# Patient Record
Sex: FEMALE | Race: WHITE | Employment: FULL TIME | URBAN - METROPOLITAN AREA
[De-identification: names, ages, dates, MRNs, and addresses within clinical notes are randomized per-mention and may not be internally consistent; named-entity substitution may affect disease eponyms.]

---

## 2018-10-03 ENCOUNTER — ED HISTORICAL/CONVERTED ENCOUNTER (OUTPATIENT)
Dept: OTHER | Age: 63
End: 2018-10-03

## 2021-03-23 ENCOUNTER — APPOINTMENT (OUTPATIENT)
Dept: CT IMAGING | Age: 66
End: 2021-03-23
Attending: NURSE PRACTITIONER
Payer: MEDICARE

## 2021-03-23 ENCOUNTER — HOSPITAL ENCOUNTER (OUTPATIENT)
Age: 66
Setting detail: OBSERVATION
Discharge: HOME | End: 2021-03-24
Attending: FAMILY MEDICINE | Admitting: FAMILY MEDICINE
Payer: MEDICARE

## 2021-03-23 DIAGNOSIS — R90.89 ABNORMAL CT OF BRAIN: ICD-10-CM

## 2021-03-23 DIAGNOSIS — G44.89 OTHER HEADACHE SYNDROME: Primary | ICD-10-CM

## 2021-03-23 DIAGNOSIS — I10 ESSENTIAL HYPERTENSION: ICD-10-CM

## 2021-03-23 PROCEDURE — 70450 CT HEAD/BRAIN W/O DYE: CPT

## 2021-03-23 PROCEDURE — 96374 THER/PROPH/DIAG INJ IV PUSH: CPT

## 2021-03-23 PROCEDURE — 96375 TX/PRO/DX INJ NEW DRUG ADDON: CPT

## 2021-03-23 PROCEDURE — 99285 EMERGENCY DEPT VISIT HI MDM: CPT

## 2021-03-24 ENCOUNTER — APPOINTMENT (OUTPATIENT)
Dept: MRI IMAGING | Age: 66
End: 2021-03-24
Attending: FAMILY MEDICINE
Payer: MEDICARE

## 2021-03-24 VITALS
RESPIRATION RATE: 16 BRPM | BODY MASS INDEX: 21.76 KG/M2 | HEART RATE: 68 BPM | DIASTOLIC BLOOD PRESSURE: 85 MMHG | SYSTOLIC BLOOD PRESSURE: 134 MMHG | OXYGEN SATURATION: 98 % | TEMPERATURE: 98.1 F | WEIGHT: 152 LBS | HEIGHT: 70 IN

## 2021-03-24 PROBLEM — R90.89 ABNORMAL CT OF BRAIN: Status: ACTIVE | Noted: 2021-03-24

## 2021-03-24 LAB
ANION GAP SERPL CALC-SCNC: 5 MMOL/L (ref 5–15)
APTT PPP: 25.9 SEC (ref 23–35.7)
ATRIAL RATE: 66 BPM
BASOPHILS # BLD: 0 K/UL (ref 0–0.1)
BASOPHILS NFR BLD: 0 % (ref 0–1)
BUN SERPL-MCNC: 17 MG/DL (ref 6–20)
BUN/CREAT SERPL: 20 (ref 12–20)
CA-I BLD-MCNC: 9.3 MG/DL (ref 8.5–10.1)
CALCULATED P AXIS, ECG09: 57 DEGREES
CALCULATED R AXIS, ECG10: 109 DEGREES
CALCULATED T AXIS, ECG11: 14 DEGREES
CHLORIDE SERPL-SCNC: 107 MMOL/L (ref 97–108)
CO2 SERPL-SCNC: 28 MMOL/L (ref 21–32)
CREAT SERPL-MCNC: 0.84 MG/DL (ref 0.55–1.02)
CRP SERPL-MCNC: <0.29 MG/DL (ref 0–0.6)
DIAGNOSIS, 93000: NORMAL
DIFFERENTIAL METHOD BLD: NORMAL
EOSINOPHIL # BLD: 0.1 K/UL (ref 0–0.4)
EOSINOPHIL NFR BLD: 2 % (ref 0–7)
ERYTHROCYTE [DISTWIDTH] IN BLOOD BY AUTOMATED COUNT: 12.7 % (ref 11.5–14.5)
ERYTHROCYTE [SEDIMENTATION RATE] IN BLOOD: 9 MM/HR
GLUCOSE SERPL-MCNC: 105 MG/DL (ref 65–100)
HCT VFR BLD AUTO: 41.3 % (ref 35–47)
HGB BLD-MCNC: 13.7 G/DL (ref 11.5–16)
IMM GRANULOCYTES # BLD AUTO: 0 K/UL (ref 0–0.04)
IMM GRANULOCYTES NFR BLD AUTO: 0 % (ref 0–0.5)
INR PPP: 1 (ref 0.9–1.1)
LYMPHOCYTES # BLD: 2.2 K/UL (ref 0.8–3.5)
LYMPHOCYTES NFR BLD: 34 % (ref 12–49)
MCH RBC QN AUTO: 32.7 PG (ref 26–34)
MCHC RBC AUTO-ENTMCNC: 33.2 G/DL (ref 30–36.5)
MCV RBC AUTO: 98.6 FL (ref 80–99)
MONOCYTES # BLD: 0.7 K/UL (ref 0–1)
MONOCYTES NFR BLD: 11 % (ref 5–13)
NEUTS SEG # BLD: 3.4 K/UL (ref 1.8–8)
NEUTS SEG NFR BLD: 53 % (ref 32–75)
P-R INTERVAL, ECG05: 172 MS
PLATELET # BLD AUTO: 189 K/UL (ref 150–400)
PMV BLD AUTO: 11 FL (ref 8.9–12.9)
POTASSIUM SERPL-SCNC: 3.7 MMOL/L (ref 3.5–5.1)
PROTHROMBIN TIME: 13 SEC (ref 11.9–14.7)
Q-T INTERVAL, ECG07: 404 MS
QRS DURATION, ECG06: 88 MS
QTC CALCULATION (BEZET), ECG08: 423 MS
RBC # BLD AUTO: 4.19 M/UL (ref 3.8–5.2)
SODIUM SERPL-SCNC: 140 MMOL/L (ref 136–145)
THERAPEUTIC RANGE,PTTT: NORMAL SEC (ref 68–109)
VENTRICULAR RATE, ECG03: 66 BPM
WBC # BLD AUTO: 6.5 K/UL (ref 3.6–11)

## 2021-03-24 PROCEDURE — 74011250636 HC RX REV CODE- 250/636: Performed by: HOSPITALIST

## 2021-03-24 PROCEDURE — 70553 MRI BRAIN STEM W/O & W/DYE: CPT

## 2021-03-24 PROCEDURE — 85610 PROTHROMBIN TIME: CPT

## 2021-03-24 PROCEDURE — 36415 COLL VENOUS BLD VENIPUNCTURE: CPT

## 2021-03-24 PROCEDURE — 74011250636 HC RX REV CODE- 250/636: Performed by: NURSE PRACTITIONER

## 2021-03-24 PROCEDURE — 85652 RBC SED RATE AUTOMATED: CPT

## 2021-03-24 PROCEDURE — 96374 THER/PROPH/DIAG INJ IV PUSH: CPT

## 2021-03-24 PROCEDURE — 85730 THROMBOPLASTIN TIME PARTIAL: CPT

## 2021-03-24 PROCEDURE — A9577 INJ MULTIHANCE: HCPCS | Performed by: HOSPITALIST

## 2021-03-24 PROCEDURE — 85025 COMPLETE CBC W/AUTO DIFF WBC: CPT

## 2021-03-24 PROCEDURE — 74011250637 HC RX REV CODE- 250/637: Performed by: NURSE PRACTITIONER

## 2021-03-24 PROCEDURE — 80048 BASIC METABOLIC PNL TOTAL CA: CPT

## 2021-03-24 PROCEDURE — 93005 ELECTROCARDIOGRAM TRACING: CPT

## 2021-03-24 PROCEDURE — 86140 C-REACTIVE PROTEIN: CPT

## 2021-03-24 PROCEDURE — 99218 HC RM OBSERVATION: CPT

## 2021-03-24 RX ORDER — SODIUM CHLORIDE 0.9 % (FLUSH) 0.9 %
5-40 SYRINGE (ML) INJECTION EVERY 8 HOURS
Status: DISCONTINUED | OUTPATIENT
Start: 2021-03-24 | End: 2021-03-24 | Stop reason: HOSPADM

## 2021-03-24 RX ORDER — HYDRALAZINE HYDROCHLORIDE 20 MG/ML
5 INJECTION INTRAMUSCULAR; INTRAVENOUS ONCE
Status: COMPLETED | OUTPATIENT
Start: 2021-03-24 | End: 2021-03-24

## 2021-03-24 RX ORDER — EZETIMIBE 10 MG/1
10 TABLET ORAL DAILY
COMMUNITY
Start: 2021-02-19

## 2021-03-24 RX ORDER — ONDANSETRON 2 MG/ML
4 INJECTION INTRAMUSCULAR; INTRAVENOUS
Status: DISCONTINUED | OUTPATIENT
Start: 2021-03-24 | End: 2021-03-24 | Stop reason: HOSPADM

## 2021-03-24 RX ORDER — LISINOPRIL 10 MG/1
10 TABLET ORAL DAILY
Qty: 30 TAB | Refills: 0 | OUTPATIENT
Start: 2021-03-24 | End: 2021-03-24 | Stop reason: SDUPTHER

## 2021-03-24 RX ORDER — ASPIRIN 325 MG
325 TABLET ORAL
Status: COMPLETED | OUTPATIENT
Start: 2021-03-24 | End: 2021-03-24

## 2021-03-24 RX ORDER — ACETAMINOPHEN 325 MG/1
650 TABLET ORAL
Status: DISCONTINUED | OUTPATIENT
Start: 2021-03-24 | End: 2021-03-24 | Stop reason: HOSPADM

## 2021-03-24 RX ORDER — PROMETHAZINE HYDROCHLORIDE 25 MG/1
12.5 TABLET ORAL
Status: DISCONTINUED | OUTPATIENT
Start: 2021-03-24 | End: 2021-03-24 | Stop reason: HOSPADM

## 2021-03-24 RX ORDER — ACETAMINOPHEN 650 MG/1
650 SUPPOSITORY RECTAL
Status: DISCONTINUED | OUTPATIENT
Start: 2021-03-24 | End: 2021-03-24 | Stop reason: HOSPADM

## 2021-03-24 RX ORDER — EZETIMIBE 10 MG/1
10 TABLET ORAL DAILY
Status: CANCELLED | OUTPATIENT
Start: 2021-03-24

## 2021-03-24 RX ORDER — SODIUM CHLORIDE 0.9 % (FLUSH) 0.9 %
5-40 SYRINGE (ML) INJECTION AS NEEDED
Status: DISCONTINUED | OUTPATIENT
Start: 2021-03-24 | End: 2021-03-24 | Stop reason: HOSPADM

## 2021-03-24 RX ORDER — HYDRALAZINE HYDROCHLORIDE 25 MG/1
25 TABLET, FILM COATED ORAL
Status: DISCONTINUED | OUTPATIENT
Start: 2021-03-24 | End: 2021-03-24 | Stop reason: HOSPADM

## 2021-03-24 RX ORDER — POLYETHYLENE GLYCOL 3350 17 G/17G
17 POWDER, FOR SOLUTION ORAL DAILY PRN
Status: DISCONTINUED | OUTPATIENT
Start: 2021-03-24 | End: 2021-03-24 | Stop reason: HOSPADM

## 2021-03-24 RX ORDER — ERGOCALCIFEROL 1.25 MG/1
50000 CAPSULE ORAL
COMMUNITY
Start: 2021-02-19

## 2021-03-24 RX ORDER — LISINOPRIL 10 MG/1
10 TABLET ORAL DAILY
Qty: 30 TAB | Refills: 0 | Status: SHIPPED | OUTPATIENT
Start: 2021-03-24

## 2021-03-24 RX ADMIN — ASPIRIN 325 MG ORAL TABLET 325 MG: 325 PILL ORAL at 01:19

## 2021-03-24 RX ADMIN — HYDRALAZINE HYDROCHLORIDE 5 MG: 20 INJECTION INTRAMUSCULAR; INTRAVENOUS at 01:20

## 2021-03-24 RX ADMIN — GADOBENATE DIMEGLUMINE 15 ML: 529 INJECTION, SOLUTION INTRAVENOUS at 08:16

## 2021-03-24 NOTE — PROGRESS NOTES
// Pt informed of MOON notification, verbalized understanding & signed. Copy given to pt, placed in chart, & original to HIM for scanning into EMR.

## 2021-03-24 NOTE — ED PROVIDER NOTES
EMERGENCY DEPARTMENT HISTORY AND PHYSICAL EXAM      Date: 3/23/2021  Patient Name: Florentino Pike    History of Presenting Illness     Chief Complaint   Patient presents with    Headache       History Provided By: Patient    HPI: Florentino Pike, 72 y.o. female with a past medical history significant hypertension and hyperlipidemia presents to the ED with cc of headache. Patient states that beginning Sunday she woke up with a headache. Patient does not have a history of headaches. Patient states she took her blood pressure at that time and it was in the 160s. Patient has been continually taken blood pressure since then with a high readings in the 180s. Patient was unable to get an appointment with her PCP so [facility]  Patient first sent to her to the ER [facility] for CT of her head. Patient denies any slurred speech. Patient denies any dizziness, weakness, or any other neurological symptoms. Patient describes the headache as dull in nature on both sides of her head. Patient also describes her headache as waxing and waning in nature. There are no other complaints, changes, or physical findings at this time. PCP: Bertin Escobedo MD    No current facility-administered medications on file prior to encounter. No current outpatient medications on file prior to encounter. Past History     Past Medical History:  Past Medical History:   Diagnosis Date    CAD (coronary artery disease)     high cholesterol diet controlled    Hypercholesteremia     Hypertension     Other ill-defined conditions(799.89)     back problems       Past Surgical History:  Past Surgical History:   Procedure Laterality Date    HX HIP REPLACEMENT      HX OTHER SURGICAL      colonoscopy/polypectomy 3yrs.  ago    SC ABDOMEN SURGERY PROC UNLISTED      cholecystectomy       Family History:  Family History   Problem Relation Age of Onset    Heart Disease Mother         CHF    Cancer Father lung       Social History:  Social History     Tobacco Use    Smoking status: Never Smoker    Smokeless tobacco: Never Used   Substance Use Topics    Alcohol use: Yes     Alcohol/week: 1.0 standard drinks     Types: 1 Glasses of wine per week     Comment: a glass of wine a week or so     Drug use: Never       Allergies:  No Known Allergies      Review of Systems     Review of Systems   Constitutional: Negative for chills, fatigue and fever. HENT: Negative for congestion, sinus pressure and trouble swallowing. Eyes: Negative for photophobia and pain. Respiratory: Negative for cough and shortness of breath. Cardiovascular: Negative for chest pain and leg swelling. Gastrointestinal: Negative for abdominal pain, diarrhea, nausea and vomiting. Endocrine: Negative for polydipsia, polyphagia and polyuria. Genitourinary: Negative for decreased urine volume, difficulty urinating, dysuria, hematuria and urgency. Musculoskeletal: Negative for back pain, gait problem, myalgias and neck pain. Skin: Negative for pallor and rash. Allergic/Immunologic: Negative for environmental allergies and food allergies. Neurological: Positive for headaches. Negative for dizziness, facial asymmetry, speech difficulty and numbness. Hematological: Negative for adenopathy. Does not bruise/bleed easily. Psychiatric/Behavioral: Negative for agitation, self-injury and suicidal ideas. The patient is not nervous/anxious. Physical Exam     Physical Exam  Vitals signs and nursing note reviewed. Constitutional:       Appearance: Normal appearance. HENT:      Head: Atraumatic. Right Ear: Tympanic membrane and external ear normal.      Left Ear: Tympanic membrane and external ear normal.      Nose: Nose normal.      Mouth/Throat:      Mouth: Mucous membranes are moist.   Eyes:      Extraocular Movements: Extraocular movements intact. Pupils: Pupils are equal, round, and reactive to light.    Neck: Musculoskeletal: Normal range of motion and neck supple. Cardiovascular:      Rate and Rhythm: Normal rate and regular rhythm. Pulses: Normal pulses. Heart sounds: Normal heart sounds. Pulmonary:      Breath sounds: Normal breath sounds. Abdominal:      General: Abdomen is flat. Palpations: Abdomen is soft. Musculoskeletal: Normal range of motion. Skin:     General: Skin is warm and dry. Capillary Refill: Capillary refill takes less than 2 seconds. Neurological:      General: No focal deficit present. Mental Status: She is alert and oriented to person, place, and time. Mental status is at baseline. Psychiatric:         Mood and Affect: Mood normal.         Behavior: Behavior normal.         Lab and Diagnostic Study Results     Labs -     Recent Results (from the past 12 hour(s))   CBC WITH AUTOMATED DIFF    Collection Time: 03/24/21 12:14 AM   Result Value Ref Range    WBC 6.5 3.6 - 11.0 K/uL    RBC 4.19 3.80 - 5.20 M/uL    HGB 13.7 11.5 - 16.0 g/dL    HCT 41.3 35.0 - 47.0 %    MCV 98.6 80.0 - 99.0 FL    MCH 32.7 26.0 - 34.0 PG    MCHC 33.2 30.0 - 36.5 g/dL    RDW 12.7 11.5 - 14.5 %    PLATELET 295 814 - 597 K/uL    MPV 11.0 8.9 - 12.9 FL    NEUTROPHILS 53 32 - 75 %    LYMPHOCYTES 34 12 - 49 %    MONOCYTES 11 5 - 13 %    EOSINOPHILS 2 0 - 7 %    BASOPHILS 0 0 - 1 %    IMMATURE GRANULOCYTES 0 0.0 - 0.5 %    ABS. NEUTROPHILS 3.4 1.8 - 8.0 K/UL    ABS. LYMPHOCYTES 2.2 0.8 - 3.5 K/UL    ABS. MONOCYTES 0.7 0.0 - 1.0 K/UL    ABS. EOSINOPHILS 0.1 0.0 - 0.4 K/UL    ABS. BASOPHILS 0.0 0.0 - 0.1 K/UL    ABS. IMM.  GRANS. 0.0 0.00 - 0.04 K/UL    DF AUTOMATED     METABOLIC PANEL, BASIC    Collection Time: 03/24/21 12:14 AM   Result Value Ref Range    Sodium 140 136 - 145 mmol/L    Potassium 3.7 3.5 - 5.1 mmol/L    Chloride 107 97 - 108 mmol/L    CO2 28 21 - 32 mmol/L    Anion gap 5 5 - 15 mmol/L    Glucose 105 (H) 65 - 100 mg/dL    BUN 17 6 - 20 mg/dL    Creatinine 0.84 0.55 - 1.02 mg/dL BUN/Creatinine ratio 20 12 - 20      GFR est AA >60 >60 ml/min/1.73m2    GFR est non-AA >60 >60 ml/min/1.73m2    Calcium 9.3 8.5 - 10.1 mg/dL   PROTHROMBIN TIME + INR    Collection Time: 03/24/21 12:14 AM   Result Value Ref Range    Prothrombin time 13.0 11.9 - 14.7 sec    INR 1.0 0.9 - 1.1     PTT    Collection Time: 03/24/21 12:14 AM   Result Value Ref Range    aPTT 25.9 23.0 - 35.7 sec    aPTT, therapeutic range   68 - 109 sec   SED RATE (ESR)    Collection Time: 03/24/21 12:14 AM   Result Value Ref Range    Sed rate, automated 9 mm/hr   C REACTIVE PROTEIN, QT    Collection Time: 03/24/21 12:14 AM   Result Value Ref Range    C-Reactive protein <0.29 0.00 - 0.60 mg/dL       Radiologic Studies -   @lastxrresult@  CT Results  (Last 48 hours)    None        CXR Results  (Last 48 hours)    None                ED from 3/23/2021 in [facility] EMERGENCY DEPT    3/24/21    0027   NIH Stroke Scale     Interval Baseline   LOC Alert   LOC Questions Answers both questions correctly   LOC Commands Performs both tasks correctly   Best Gaze Normal   Visual No visual loss   Facial Palsy Normal symmetrical movement   Motor Right Arm No drift   Motor Left Arm No drift   Motor Right Leg No drift   Motor Left Leg No drift   Limb Ataxia Absent   Sensory Normal   Best Language No aphasia   Dysarthria Normal   Extinction and Inattention No abnormality   Total 0         Medical Decision Making   - I am the first provider for this patient. - I reviewed the vital signs, available nursing notes, past medical history, past surgical history, family history and social history. - Initial assessment performed. The patients presenting problems have been discussed, and they are in agreement with the care plan formulated and outlined with them. I have encouraged them to ask questions as they arise throughout their visit. Vital Signs-Reviewed the patient's vital signs.   Patient Vitals for the past 12 hrs:   Temp Pulse Resp BP SpO2   03/24/21 0119  67  (!) 149/92    03/23/21 2303 98.1 °F (36.7 °C) 67 17 (!) 185/74 100 %       Records Reviewed: Nursing Notes and Old Medical Records          ED Course:          Provider Notes (Medical Decision Making):   Pt presents with acute headache; afebrile with stable vitals; exam is without focal deficits. DDx: migraine, tension headache, cluster HA, stress, hypertensive urgency, dehydration. HA was gradual onset, pt neuro intact, no nausea/vomiting/focal weakness/sensory change to suggest CVA or ICH. Also pt is not immunocompromised, no fever, no focal weakness to suggest brain abscess. Therefore, no CT head. No neck stiffness, fever, AMS, photophobia to suggest meningitis. Neg kernig and Brudzinski. Therefore no LP. No jaw claudication, visual changes or temporal pain to suggest temporal arteritis, therefore no ESR/CRP. No eye pain, PERRL, no red eye to suggest glaucoma. No risk factors for CO. Will treat pain and reassess. MDM       Procedures   Medical Decision Makingedical Decision Making  Performed by: Paul Davis NP  PROCEDURES:  Procedures       Disposition   Disposition: Admitted to Floor Medical Floor the case was discussed with the admitting physician Dr Siobhan Moyer:  1. There are no discharge medications for this patient. 2.   Follow-up Information    None       3. Return to ED if worse   4. There are no discharge medications for this patient. Diagnosis     Clinical Impression:   1. Other headache syndrome    2. Abnormal CT of brain    3. Essential hypertension        Attestations:    Paul Davis NP    Please note that this dictation was completed with [employer], the [employer] voice recognition software. Quite often unanticipated grammatical, syntax, homophones, and other interpretive errors are inadvertently transcribed by the computer software. Please disregard these errors. Please excuse any errors that have escaped final proofreading. Thank you.

## 2021-03-24 NOTE — ED PROVIDER NOTES
EMERGENCY DEPARTMENT HISTORY AND PHYSICAL EXAM 
 
 
Date: 3/23/2021 Patient Name: Shaylee Mclean History of Presenting Illness Chief Complaint Patient presents with  
 Headache History Provided By: Patient HPI: Shaylee Mclean, 72 y.o. female with a past medical history significant hypertension and hyperlipidemia presents to the ED with cc of headache. Patient states that beginning Sunday she woke up with a headache. Patient does not have a history of headaches. Patient states she took her blood pressure at that time and it was in the 160s. Patient has been continually taken blood pressure since then with a high readings in the 180s. Patient was unable to get an appointment with her PCP [facility]  Patient first sent to her to the ER [facility] for CT of her head. Patient denies any slurred speech. Patient denies any dizziness, weakness, or any other neurological symptoms. Patient describes the headache as dull in nature on both sides of her head. Patient also describes her headache as waxing and waning in nature. There are no other complaints, changes, or physical findings at this time. PCP: Elbert Mariee MD 
 
No current facility-administered medications on file prior to encounter. No current outpatient medications on file prior to encounter. Past History Past Medical History: 
Past Medical History:  
Diagnosis Date  CAD (coronary artery disease)   
 high cholesterol diet controlled  Hypercholesteremia  Hypertension  Other ill-defined conditions(709.89)   
 back problems Past Surgical History: 
Past Surgical History:  
Procedure Laterality Date  HX HIP REPLACEMENT    
 HX OTHER SURGICAL    
 colonoscopy/polypectomy 3yrs. ago  MA ABDOMEN SURGERY PROC UNLISTED    
 cholecystectomy Family History: 
Family History Problem Relation Age of Onset  Heart Disease Mother CHF  Cancer Father lung  
 
 
Social History: 
Social History Tobacco Use  Smoking status: Never Smoker  Smokeless tobacco: Never Used Substance Use Topics  Alcohol use: Yes Alcohol/week: 1.0 standard drinks Types: 1 Glasses of wine per week Comment: a glass of wine a week or so  Drug use: Never Allergies: 
No Known Allergies Review of Systems Review of Systems Constitutional: Negative for chills, fatigue and fever. HENT: Negative for congestion, sinus pressure and trouble swallowing. Eyes: Negative for photophobia and pain. Respiratory: Negative for cough and shortness of breath. Cardiovascular: Negative for chest pain and leg swelling. Gastrointestinal: Negative for abdominal pain, diarrhea, nausea and vomiting. Endocrine: Negative for polydipsia, polyphagia and polyuria. Genitourinary: Negative for decreased urine volume, difficulty urinating, dysuria, hematuria and urgency. Musculoskeletal: Negative for back pain, gait problem, myalgias and neck pain. Skin: Negative for pallor and rash. Allergic/Immunologic: Negative for environmental allergies and food allergies. Neurological: Positive for headaches. Negative for dizziness, facial asymmetry, speech difficulty and numbness. Hematological: Negative for adenopathy. Does not bruise/bleed easily. Psychiatric/Behavioral: Negative for agitation, self-injury and suicidal ideas. The patient is not nervous/anxious. Physical Exam  
 
Physical Exam 
Vitals signs and nursing note reviewed. Constitutional:   
   Appearance: Normal appearance. HENT:  
   Head: Atraumatic. Right Ear: Tympanic membrane and external ear normal.  
   Left Ear: Tympanic membrane and external ear normal.  
   Nose: Nose normal.  
   Mouth/Throat:  
   Mouth: Mucous membranes are moist.  
Eyes:  
   Extraocular Movements: Extraocular movements intact. Pupils: Pupils are equal, round, and reactive to light. Neck: Musculoskeletal: Normal range of motion and neck supple. Cardiovascular:  
   Rate and Rhythm: Normal rate and regular rhythm. Pulses: Normal pulses. Heart sounds: Normal heart sounds. Pulmonary:  
   Breath sounds: Normal breath sounds. Abdominal:  
   General: Abdomen is flat. Palpations: Abdomen is soft. Musculoskeletal: Normal range of motion. Skin: 
   General: Skin is warm and dry. Capillary Refill: Capillary refill takes less than 2 seconds. Neurological:  
   General: No focal deficit present. Mental Status: She is alert and oriented to person, place, and time. Mental status is at baseline. Psychiatric:     
   Mood and Affect: Mood normal.     
   Behavior: Behavior normal.  
 
 
 
Lab and Diagnostic Study Results Labs - Recent Results (from the past 12 hour(s)) METABOLIC PANEL, BASIC Collection Time: 03/24/21 12:14 AM  
Result Value Ref Range Sodium 140 136 - 145 mmol/L Potassium 3.7 3.5 - 5.1 mmol/L Chloride 107 97 - 108 mmol/L  
 CO2 28 21 - 32 mmol/L Anion gap 5 5 - 15 mmol/L Glucose 105 (H) 65 - 100 mg/dL BUN 17 6 - 20 mg/dL Creatinine 0.84 0.55 - 1.02 mg/dL BUN/Creatinine ratio 20 12 - 20 GFR est AA >60 >60 ml/min/1.73m2 GFR est non-AA >60 >60 ml/min/1.73m2 Calcium 9.3 8.5 - 10.1 mg/dL C REACTIVE PROTEIN, QT Collection Time: 03/24/21 12:14 AM  
Result Value Ref Range C-Reactive protein <0.29 0.00 - 0.60 mg/dL Radiologic Studies -  
@lastxrresult@ CT Results  (Last 48 hours) None CXR Results  (Last 48 hours) None ED from 3/23/2021 in [facility] EMERGENCY DEPT  
 3/24/21  
 0788 NIH Stroke Scale Interval Baseline LOC Alert LOC Questions Answers both questions correctly LOC Commands Performs both tasks correctly Best Gaze Normal  
Visual No visual loss Facial Palsy Normal symmetrical movement Motor Right Arm No drift Motor Left Arm No drift Motor Right Leg No drift Motor Left Leg No drift Limb Ataxia Absent Sensory Normal  
Best Language No aphasia Dysarthria Normal  
Extinction and Inattention No abnormality Total 0 Medical Decision Making - I am the first provider for this patient. - I reviewed the vital signs, available nursing notes, past medical history, past surgical history, family history and social history. - Initial assessment performed. The patients presenting problems have been discussed, and they are in agreement with the care plan formulated and outlined with them. I have encouraged them to ask questions as they arise throughout their visit. Vital Signs-Reviewed the patient's vital signs. Patient Vitals for the past 12 hrs: 
 Temp Pulse Resp BP SpO2  
03/24/21 0119  67  (!) 149/92   
03/23/21 2303 98.1 °F (36.7 °C) 67 17 (!) 185/74 100 % Records Reviewed: Nursing Notes and Old Medical Records ED Course:  
 
  
 
Provider Notes (Medical Decision Making): Pt presents with acute headache; afebrile with stable vitals; exam is without focal deficits. DDx: migraine, tension headache, cluster HA, stress, hypertensive urgency, dehydration. HA was gradual onset, pt neuro intact, no nausea/vomiting/focal weakness/sensory change to suggest CVA or ICH. Also pt is not immunocompromised, no fever, no focal weakness to suggest brain abscess. Therefore, no CT head. No neck stiffness, fever, AMS, photophobia to suggest meningitis. Neg kernig and Brudzinski. Therefore no LP. No jaw claudication, visual changes or temporal pain to suggest temporal arteritis, therefore no ESR/CRP. No eye pain, PERRL, no red eye to suggest glaucoma. No risk factors for CO. Will treat pain and reassess. MDM Procedures Medical Decision Makingedical Decision Making Performed by: Lupe Jones NP 
PROCEDURES: 
Procedures Disposition Disposition: Admitted to Floor Medical Floor the case was discussed with the admitting physician Dr Carter Briggs DISCHARGE PLAN: 
1. There are no discharge medications for this patient. 2.  
Follow-up Information None 3. Return to ED if worse 4. There are no discharge medications for this patient. Diagnosis Clinical Impression: No diagnosis found. Attestations: 
 
Nii Arnold NP Please note that this dictation was completed with [employer], the computer voice recognition software. Quite often unanticipated grammatical, syntax, homophones, and other interpretive errors are inadvertently transcribed by the computer software. Please disregard these errors. Please excuse any errors that have escaped final proofreading. Thank you.

## 2021-03-24 NOTE — ED TRIAGE NOTES
[facility] went for a headache that she has had since Sunday, they sent her over here for a head CT patient has had some hypertension recently that she states she does not take medication for

## 2021-03-24 NOTE — CONSULTS
Consult Date: 3/24/2021    Consults MsJigna is a 72year old woman with history of diet controlled HTN who came in with BP in 185/75 and right parietal dull headaches since Sunday. Ct head showed some questionable hypodensity. MRI was reviewed by me and shows non specific mild white matter disease otherwise normal. Findings discussed with patient. Her BP has improved and headache also come down to 3/10 intensity. She is sitting up and talking. Subjective     Past Medical History:   Diagnosis Date    Hypercholesteremia     Vitamin D deficiency       Past Surgical History:   Procedure Laterality Date    HX HIP REPLACEMENT      HX OTHER SURGICAL      colonoscopy/polypectomy 3yrs. ago    [place] ABDOMEN SURGERY PROC UNLISTED      cholecystectomy     Family History   Problem Relation Age of Onset    Heart Disease Mother         CHF    Cancer Father         lung      Social History     Tobacco Use    Smoking status: Never Smoker    Smokeless tobacco: Never Used   Substance Use Topics    Alcohol use:  Yes     Alcohol/week: 1.0 standard drinks     Types: 1 Glasses of wine per week     Comment: a glass of wine a week or so        Current Facility-Administered Medications   Medication Dose Route Frequency Provider Last Rate Last Admin    sodium chloride (NS) flush 5-40 mL  5-40 mL IntraVENous Q8H Lesli Ha MD        sodium chloride (NS) flush 5-40 mL  5-40 mL IntraVENous PRN Lesli Ha MD        acetaminophen (TYLENOL) tablet 650 mg  650 mg Oral Q6H PRALEENA Ha MD        Or    acetaminophen (TYLENOL) suppository 650 mg  650 mg Rectal Q6H PRALEENA Ha MD        polyethylene glycol (MIRALAX) packet 17 g  17 g Oral DAILY PRN Lesli Ha MD        promethazine (PHENERGAN) tablet 12.5 mg  12.5 mg Oral Q6H PRALEENA Ha MD        Or    ondansetron [facility]) injection 4 mg  4 mg IntraVENous Q6H PRALEENA Ha MD        hydrALAZINE (APRESOLINE) tablet 25 mg  25 mg Oral Q8H PRN Dariela Carbajal MD         Current Outpatient Medications   Medication Sig Dispense Refill    ezetimibe (ZETIA) 10 mg tablet Take 10 mg by mouth daily.  ergocalciferol (ERGOCALCIFEROL) 1,250 mcg (50,000 unit) capsule Take 50,000 Units by mouth. Takes on Wednesdays and Sundays          Review of Systems   Neurological: Positive for headaches. All other systems reviewed and are negative. Objective     Vital signs for last 24 hours:  Visit Vitals  /85   Pulse 68   Temp 98.1 °F (36.7 °C)   Resp 16   Ht 5' 10\" (1.778 m)   Wt 68.9 kg (152 lb)   SpO2 98%   BMI 21.81 kg/m²       Intake/Output this shift:  Current Shift: No intake/output data recorded. Last 3 Shifts: No intake/output data recorded. Data Review:   Recent Results (from the past 24 hour(s))   CBC WITH AUTOMATED DIFF    Collection Time: 03/24/21 12:14 AM   Result Value Ref Range    WBC 6.5 3.6 - 11.0 K/uL    RBC 4.19 3.80 - 5.20 M/uL    HGB 13.7 11.5 - 16.0 g/dL    HCT 41.3 35.0 - 47.0 %    MCV 98.6 80.0 - 99.0 FL    MCH 32.7 26.0 - 34.0 PG    MCHC 33.2 30.0 - 36.5 g/dL    RDW 12.7 11.5 - 14.5 %    PLATELET 631 508 - 049 K/uL    MPV 11.0 8.9 - 12.9 FL    NEUTROPHILS 53 32 - 75 %    LYMPHOCYTES 34 12 - 49 %    MONOCYTES 11 5 - 13 %    EOSINOPHILS 2 0 - 7 %    BASOPHILS 0 0 - 1 %    IMMATURE GRANULOCYTES 0 0.0 - 0.5 %    ABS. NEUTROPHILS 3.4 1.8 - 8.0 K/UL    ABS. LYMPHOCYTES 2.2 0.8 - 3.5 K/UL    ABS. MONOCYTES 0.7 0.0 - 1.0 K/UL    ABS. EOSINOPHILS 0.1 0.0 - 0.4 K/UL    ABS. BASOPHILS 0.0 0.0 - 0.1 K/UL    ABS. IMM.  GRANS. 0.0 0.00 - 0.04 K/UL    DF AUTOMATED     METABOLIC PANEL, BASIC    Collection Time: 03/24/21 12:14 AM   Result Value Ref Range    Sodium 140 136 - 145 mmol/L    Potassium 3.7 3.5 - 5.1 mmol/L    Chloride 107 97 - 108 mmol/L    CO2 28 21 - 32 mmol/L    Anion gap 5 5 - 15 mmol/L    Glucose 105 (H) 65 - 100 mg/dL    BUN 17 6 - 20 mg/dL    Creatinine 0.84 0.55 - 1.02 mg/dL    BUN/Creatinine ratio 20 12 - 20      GFR est AA >60 >60 ml/min/1.73m2    GFR est non-AA >60 >60 ml/min/1.73m2    Calcium 9.3 8.5 - 10.1 mg/dL   PROTHROMBIN TIME + INR    Collection Time: 03/24/21 12:14 AM   Result Value Ref Range    Prothrombin time 13.0 11.9 - 14.7 sec    INR 1.0 0.9 - 1.1     PTT    Collection Time: 03/24/21 12:14 AM   Result Value Ref Range    aPTT 25.9 23.0 - 35.7 sec    aPTT, therapeutic range   68 - 109 sec   SED RATE (ESR)    Collection Time: 03/24/21 12:14 AM   Result Value Ref Range    Sed rate, automated 9 mm/hr   C REACTIVE PROTEIN, QT    Collection Time: 03/24/21 12:14 AM   Result Value Ref Range    C-Reactive protein <0.29 0.00 - 0.60 mg/dL   EKG, 12 LEAD, INITIAL    Collection Time: 03/24/21  1:14 AM   Result Value Ref Range    Ventricular Rate 66 BPM    Atrial Rate 66 BPM    P-R Interval 172 ms    QRS Duration 88 ms    Q-T Interval 404 ms    QTC Calculation (Bezet) 423 ms    Calculated P Axis 57 degrees    Calculated R Axis 109 degrees    Calculated T Axis 14 degrees    Diagnosis       Normal sinus rhythm  Rightward axis  Anteroseptal infarct , age undetermined  Abnormal ECG  No previous ECGs available  Confirmed by Tucker Oglesby ([id number]) on 3/24/2021 7:22:03 AM         Physical Exam    Neuro Physical Exam      General: Well developed, well nourished. Patient in no apparent distress. Neurological Exam:  Mental Status: Awake, alert, oriented x4    Cranial Nerves:   Intact visual fields. PERRL, EOM's full, no nystagmus, no ptosis. Facial sensation is normal. Facial movement is symmetric. Palate is midline. Normal sternocleidomastoid strength. Tongue is midline. Hearing is intact bilaterally. Motor:  5/5 strength in upper and lower proximal and distal muscles. Normal bulk and tone. Reflexes:   Deep tendon reflexes 2+/4 and symmetrical.   Sensory:   Normal to temperature and vibration. Gait:  Not tested    Tremor:   No tremor noted. Cerebellar:  No cerebellar signs present.     Assessment and Plan: Ms. Chidi Pierce is a 72year old woman with HA secindary to hypertensive urgency. Medical team to treat BP. MRI unrevealing. Will sign off.

## 2021-03-24 NOTE — DISCHARGE SUMMARY
Physician Discharge Summary     Patient ID:    Ivory Sears  [id number]  89 y.o.  1955    Admit date: 3/23/2021    Discharge date : 3/24/2021    Chronic Diagnoses:    Problem List as of 3/24/2021 Never Reviewed          Codes Class Noted - Resolved    Abnormal CT of brain ICD-10-CM: R90.89  ICD-9-CM: 793.0  3/24/2021 - Present          22    Final Diagnoses:   Abnormal CT of brain [R90.89]    Reason for Hospitalization:    Headache  HTN urgency    Hospital Course:   65F, H/o HLD and vitamin D deficiency, with HTN urgency. Given the intensity of headache, MRI brain was unrevealing for acute stroke, neurology was consulted. Her blood pressure remained stable on HYDRALAZINE 25mg  She was then planned for discharge    INSTRUCTIONS ON DISCHARGE     (1) your headache was likely due to hypertension, please take lisinopril 10mg daily, check your blood pressure once a week and F/u with PCP in 1 week . (2) Continue to take all other medications            Discharge Medications:   Current Discharge Medication List      START taking these medications    Details   lisinopriL (PRINIVIL, ZESTRIL) 10 mg tablet Take 1 Tab by mouth daily. Qty: 30 Tab, Refills: 0         CONTINUE these medications which have NOT CHANGED    Details   ezetimibe (ZETIA) 10 mg tablet Take 10 mg by mouth daily. ergocalciferol (ERGOCALCIFEROL) 1,250 mcg (50,000 unit) capsule Take 50,000 Units by mouth. Takes on Wednesdays and Sundays               Follow up Care:    1. Shakir Blake MD in 1-2 weeks. Please call to set up an appointment shortly after discharge. Diet:  cardiac    Disposition:  home    Advanced Directive:   FULL x   DNR      Discharge Exam:  Physical Exam:  General: Alert and Oriented x 3. Cooperative and friendly. No acute distress. Nourished and well developed. Wears glasses. Reports that headache has resolved. Head/Eyes: Normocephalic, atraumatic, EOMI, PERRLA. Nose/Mouth:  Turbinates within normal limits, No drainage. Mucous membranes are moist.  Throat and Neck: No masses, JVD, thyromegaly or lymphadenopathy appreciated. Cervical spine has good range of motion without pain. Lungs: Clear to auscultation bilaterally without wheezes, rhonchi or crackles. Good air movement bilaterally. Symmetric chest rise with respirations. Heart: Regular rate and rhythm. Normal S1/S2. No appreciated murmurs, rubs or gallops. No lower extremity edema. Abdomen: Soft, non-tender, non-distended. Bowel sounds present in all four quadrants. No masses or hepatosplenomegaly appreciated. Extremities:  Atraumatic. Able to move all extremities symmetrically. No abnormal bony protuberances appreciated. Joints without swelling. Back: No pain with palpation over spinous processes or paraspinal musculature. No CVA tenderness. Skin: Clean, dry and intact without appreciated lesions. Neurologic: A&Ox3. Cranial nerves 2-12 are intact. Intact sensation and 5/5 motor strength in all 4 extremities. Facial features are symmetric. Speech is fluent and clear. No focal deficits appreciated. Psychiatric: Normal affect, normal thought process, good eye contact. CONSULTATIONS: neuro    Significant Diagnostic Studies:     Radiologic Studies -   No results found for this or any previous visit.    CT Results  (Last 48 hours)    None              Discharge time spent 35 minutes    Signed:  Chance Trivedi MD  3/24/2021  9:43 AM

## 2021-03-24 NOTE — DISCHARGE INSTRUCTIONS
INSTRUCTIONS ON DISCHARGE     (1) your headache was likely due to hypertension, please take lisinopril 10mg daily, check your blood pressure once a week and F/u with PCP in 1 week .     (2) Continue to take all other medications

## 2021-03-24 NOTE — ACP (ADVANCE CARE PLANNING)
Advance Care Planning     Advance Care Planning (ACP) Physician/NP/PA Conversation      Date of Conversation: 3/23/2021  Conducted with: Patient with [address] Decision Maker:     Click here to complete [address] including selection of the [address] Relationship (ie \"Primary\")  Today we documented Decision Maker(s) consistent with Legal Next of Kin hierarchy. Care Preferences:    Hospitalization: \"If your health worsens and it becomes clear that your chance of recovery is unlikely, what would be your preference regarding hospitalization? \"  The patient would prefer hospitalization. Ventilation: \"If you were unable to breathe on your own and your chance of recovery was unlikely, what would be your preference about the use of a ventilator (breathing machine) if it was available to you? \"   The patient would desire the use of a ventilator. Resuscitation: \"In the event your heart stopped as a result of an underlying serious health condition, would you want attempts to be made to restart your heart, or would you prefer a natural death? \"   Yes, attempt to resuscitate.     Additional topics discussed: resuscitation preferences    Conversation Outcomes / Follow-Up Plan:   ACP complete - no further action today  Reviewed DNR/DNI and patient elects Full Code (Attempt Resuscitation)     Length of Voluntary ACP Conversation in minutes:  <16 minutes (Non-Billable)    Edsel Osler, MD

## 2022-03-18 PROBLEM — R90.89 ABNORMAL CT OF BRAIN: Status: ACTIVE | Noted: 2021-03-24

## 2024-06-04 ENCOUNTER — ANESTHESIA EVENT (OUTPATIENT)
Facility: HOSPITAL | Age: 69
End: 2024-06-04
Payer: MEDICARE

## 2024-06-04 ENCOUNTER — ANESTHESIA (OUTPATIENT)
Facility: HOSPITAL | Age: 69
End: 2024-06-04
Payer: MEDICARE

## 2024-06-04 ENCOUNTER — HOSPITAL ENCOUNTER (OUTPATIENT)
Facility: HOSPITAL | Age: 69
Setting detail: OUTPATIENT SURGERY
Discharge: HOME | End: 2024-06-04
Attending: SPECIALIST | Admitting: SPECIALIST
Payer: MEDICARE

## 2024-06-04 VITALS
WEIGHT: 149.03 LBS | HEART RATE: 61 BPM | RESPIRATION RATE: 13 BRPM | DIASTOLIC BLOOD PRESSURE: 78 MMHG | HEIGHT: 68 IN | OXYGEN SATURATION: 100 % | TEMPERATURE: 97.9 F | SYSTOLIC BLOOD PRESSURE: 148 MMHG | BODY MASS INDEX: 22.59 KG/M2

## 2024-06-04 PROCEDURE — 2709999900 HC NON-CHARGEABLE SUPPLY: Performed by: SPECIALIST

## 2024-06-04 PROCEDURE — 3600007502: Performed by: SPECIALIST

## 2024-06-04 PROCEDURE — 3700000000 HC ANESTHESIA ATTENDED CARE: Performed by: SPECIALIST

## 2024-06-04 PROCEDURE — 3600007512: Performed by: SPECIALIST

## 2024-06-04 PROCEDURE — 7100000010 HC PHASE II RECOVERY - FIRST 15 MIN: Performed by: SPECIALIST

## 2024-06-04 PROCEDURE — 2580000003 HC RX 258: Performed by: SPECIALIST

## 2024-06-04 PROCEDURE — 7100000011 HC PHASE II RECOVERY - ADDTL 15 MIN: Performed by: SPECIALIST

## 2024-06-04 PROCEDURE — 3700000001 HC ADD 15 MINUTES (ANESTHESIA): Performed by: SPECIALIST

## 2024-06-04 PROCEDURE — 6360000002 HC RX W HCPCS: Performed by: NURSE ANESTHETIST, CERTIFIED REGISTERED

## 2024-06-04 PROCEDURE — 88305 TISSUE EXAM BY PATHOLOGIST: CPT

## 2024-06-04 RX ORDER — EZETIMIBE 10 MG/1
10 TABLET ORAL DAILY
COMMUNITY

## 2024-06-04 RX ORDER — OMEGA-3S/DHA/EPA/FISH OIL/D3 300MG-1000
CAPSULE ORAL
COMMUNITY

## 2024-06-04 RX ORDER — CALCIUM CARBONATE 500(1250)
500 TABLET ORAL DAILY
COMMUNITY

## 2024-06-04 RX ORDER — LISINOPRIL 5 MG/1
5 TABLET ORAL DAILY
COMMUNITY

## 2024-06-04 RX ORDER — IBUPROFEN 800 MG/1
800 TABLET ORAL EVERY 6 HOURS PRN
COMMUNITY

## 2024-06-04 RX ORDER — SIMETHICONE 40MG/0.6ML
40 SUSPENSION, DROPS(FINAL DOSAGE FORM)(ML) ORAL AS NEEDED
Status: DISCONTINUED | OUTPATIENT
Start: 2024-06-04 | End: 2024-06-04 | Stop reason: HOSPADM

## 2024-06-04 RX ORDER — PROPOFOL 10 MG/ML
INJECTION, EMULSION INTRAVENOUS CONTINUOUS PRN
Status: DISCONTINUED | OUTPATIENT
Start: 2024-06-04 | End: 2024-06-04 | Stop reason: SDUPTHER

## 2024-06-04 RX ORDER — SODIUM CHLORIDE 0.9 % (FLUSH) 0.9 %
5-40 SYRINGE (ML) INJECTION PRN
Status: DISCONTINUED | OUTPATIENT
Start: 2024-06-04 | End: 2024-06-04 | Stop reason: HOSPADM

## 2024-06-04 RX ORDER — SODIUM CHLORIDE 9 MG/ML
INJECTION, SOLUTION INTRAVENOUS CONTINUOUS
Status: DISCONTINUED | OUTPATIENT
Start: 2024-06-04 | End: 2024-06-04 | Stop reason: HOSPADM

## 2024-06-04 RX ADMIN — PROPOFOL 140 MCG/KG/MIN: 10 INJECTION, EMULSION INTRAVENOUS at 11:08

## 2024-06-04 RX ADMIN — PROPOFOL 60 MG: 10 INJECTION, EMULSION INTRAVENOUS at 11:09

## 2024-06-04 RX ADMIN — PROPOFOL 20 MG: 10 INJECTION, EMULSION INTRAVENOUS at 11:11

## 2024-06-04 RX ADMIN — SODIUM CHLORIDE: 9 INJECTION, SOLUTION INTRAVENOUS at 11:05

## 2024-06-04 RX ADMIN — PROPOFOL 40 MG: 10 INJECTION, EMULSION INTRAVENOUS at 11:12

## 2024-06-04 ASSESSMENT — PAIN - FUNCTIONAL ASSESSMENT: PAIN_FUNCTIONAL_ASSESSMENT: 0-10

## 2024-06-04 NOTE — H&P
68 y.o. female for open access colonoscopy for screening   Additional data for completion of the targeted pre-endoscopy H&P will be provided under 'H&P interval notes'.  Please see that document which will be attached to this.  Xavi Arellano MD

## 2024-06-04 NOTE — ANESTHESIA POSTPROCEDURE EVALUATION
Department of Anesthesiology  Postprocedure Note    Patient: Brett Herrera  MRN: [id number]  YOB: 1955  Date of evaluation: 6/4/2024    Procedure Summary       Date: 06/04/24 Room / Location: [facility] / [facility] ENDOSCOPY    Anesthesia Start: 1105 Anesthesia Stop: 1129    Procedures:       COLONOSCOPY (Lower GI Region)      COLONOSCOPY POLYPECTOMY SNARE/BIOPSY (Lower GI Region) Diagnosis:       Personal history of colonic polyps      FH: colonic polyps      Family history of malignant neoplasm of gastrointestinal tract      (Personal history of colonic polyps [Z86.010])      (FH: colonic polyps [Z83.719])      (Family history of malignant neoplasm of gastrointestinal tract [Z80.0])    Surgeons: Xavi Arellano MD Responsible Provider: Xavi Huang MD    Anesthesia Type: MAC, TIVA ASA Status: 2            Anesthesia Type: MAC, TIVA    Benji Phase I: Benji Score: 10    Benji Phase II: Benji Score: 10    Anesthesia Post Evaluation    Patient location during evaluation: PACU  Patient participation: complete - patient participated  Level of consciousness: awake  Pain score: 0  Airway patency: patent  Nausea & Vomiting: no nausea and no vomiting  Cardiovascular status: blood pressure returned to baseline  Respiratory status: acceptable  Hydration status: euvolemic  Pain management: adequate    No notable events documented.

## 2024-06-04 NOTE — DISCHARGE INSTRUCTIONS
[facility]  Formerly [facility]  Xavi Arellano MD  [phone]      June 4, 2024    Brett Herrera  YOB: 1955    COLONOSCOPY DISCHARGE INSTRUCTIONS    If there is redness at IV site you should apply warm compress to area.  If redness or soreness persist contact Dr. Arellano' or your primary care doctor.    There may be a slight amount of blood passed from the rectum.  Gaseous discomfort may develop, but walking, belching will help relieve this.  You may not operate a vehicle for 12 hours  You may not operate machinery or dangerous appliances for rest of today  You may not drink alcoholic beverages for 12 hours  Avoid making any critical decisions for 24 hours    DIET:  You may resume your normal diet, but some patients find that heavy or large meals may lead to indigestion or vomiting.  I suggest a light meal as first food intake.    MEDICATIONS:  The use of some over-the-counter pain medication may lead to bleeding after colon biopsies or polyp removal.  Tylenol (also called acetaminophen) is safe to take even if you have had colonoscopy with polyp removal.  Based on the procedure you had today you may not safely take aspirin or aspirin-like products for the next ten (10) days.  Remember that Tylenol (also called acetaminophen) is safe to take after colonoscopy even if you have had biopsies or polyps removed.    ACTIVITY:  You may resume your normal household activities, but it is recommended that you spend the remainder of the day resting -  avoid any strenuous activity.    CALL DR. ARELLANO' OFFICE IF:  Increasing pain, nausea, vomiting  Abdominal distension (swelling)  Significant new or increased bleeding (oral or rectal)  Fever/Chills  Chest pain/shortness of breath                       Additional instructions:   Great news, no colon cancer but we did find and remove one small colon polyp.  I'll contact you by letter with the

## 2024-06-04 NOTE — PERIOP NOTE
Endoscope was pre-cleaned at bedside immediately following procedure by Sara. Assumed pt care taken to recovery via stretcher for further monitoring please see CRNA chart sedation/monitoring for procedures pt tolerated well.

## 2024-06-04 NOTE — ANESTHESIA PRE PROCEDURE
Department of Anesthesiology  Preprocedure Note       Name:  Brett Herrera   Age:  68 y.o.  :  1955                                          MRN:  [id number]         Date:  2024      Surgeon: Surgeon(s):  Xavi Arellano MD    Procedure: Procedure(s):  COLONOSCOPY    Medications prior to admission:   Prior to Admission medications    Medication Sig Start Date End Date Taking? Authorizing Provider   lisinopril (PRINIVIL;ZESTRIL) 5 MG tablet Take 1 tablet by mouth daily   Yes Eunice Boyce MD   ezetimibe (ZETIA) 10 MG tablet Take 1 tablet by mouth daily   Yes Eunice Boyce MD   ibuprofen (ADVIL;MOTRIN) 800 MG tablet Take 1 tablet by mouth every 6 hours as needed for Pain   Yes Eunice Boyce MD   Omega-3 Fatty Acids (FISH OIL BURP-LESS) 1000 MG CAPS Take by mouth   Yes Eunice Boyce MD   calcium carbonate (OSCAL) 500 MG TABS tablet Take 1 tablet by mouth daily   Yes Eunice Boyce MD       Current medications:    No current facility-administered medications for this encounter.       Allergies:    Allergies   Allergen Reactions   • Penicillins Rash       Problem List:    Patient Active Problem List   Diagnosis Code   • Abnormal CT of brain R90.89       Past Medical History:        Diagnosis Date   • Hypercholesteremia    • Hypertension    • Vitamin D deficiency        Past Surgical History:        Procedure Laterality Date   • HYSTERECTOMY (CERVIX STATUS UNKNOWN)     • OTHER SURGICAL HISTORY      colonoscopy/polypectomy 3yrs. ago   • [place] UNLISTED PROCEDURE ABDOMEN PERITONEUM & OMENTUM      cholecystectomy   • TOTAL HIP ARTHROPLASTY         Social History:    Social History     Tobacco Use   • Smoking status: Never   • Smokeless tobacco: Never   Substance Use Topics   • Alcohol use: Yes     Alcohol/week: 1.0 standard drink of alcohol     Comment: occationally                                Counseling given: Not Answered      Vital Signs (Current): There were no

## 2024-06-04 NOTE — PROGRESS NOTES
Endoscopy discharge instructions have been reviewed and given to patient.  The patient verbalized understanding and acceptance of instructions.      Dr. Arellano discussed with son procedure findings and next steps.

## 2024-06-04 NOTE — H&P
Pre-Endoscopy H&P Update  Chief complaint/HPI/ROS:  The indication for the procedure, the patient's history and the patient's current medications are reviewed prior to the procedure and that data is reported on the H&P to which this document is attached.  Any significant complaints with regard to organ systems will be noted, and if not mentioned then a review of systems is not contributory.  Past Medical History:   Diagnosis Date    Hypercholesteremia     Hypertension     Vitamin D deficiency       Past Surgical History:   Procedure Laterality Date    HYSTERECTOMY (CERVIX STATUS UNKNOWN)      OTHER SURGICAL HISTORY      colonoscopy/polypectomy 3yrs. ago    [place] UNLISTED PROCEDURE ABDOMEN PERITONEUM & OMENTUM      cholecystectomy    TOTAL HIP ARTHROPLASTY       Social   Social History     Tobacco Use    Smoking status: Never    Smokeless tobacco: Never   Substance Use Topics    Alcohol use: Yes     Alcohol/week: 1.0 standard drink of alcohol     Comment: occationally      Family History   Problem Relation Age of Onset    Cancer Father         lung    Heart Disease Mother         CHF      Allergies   Allergen Reactions    Penicillins Rash      Prior to Admission Medications   Prescriptions Last Dose Informant Patient Reported? Taking?   Omega-3 Fatty Acids (FISH OIL BURP-LESS) 1000 MG CAPS Past Week  Yes Yes   Sig: Take by mouth   calcium carbonate (OSCAL) 500 MG TABS tablet Past Week  Yes Yes   Sig: Take 1 tablet by mouth daily   ezetimibe (ZETIA) 10 MG tablet 6/4/2024  Yes Yes   Sig: Take 1 tablet by mouth daily   ibuprofen (ADVIL;MOTRIN) 800 MG tablet Past Month  Yes Yes   Sig: Take 1 tablet by mouth every 6 hours as needed for Pain   lisinopril (PRINIVIL;ZESTRIL) 5 MG tablet 6/4/2024  Yes Yes   Sig: Take 1 tablet by mouth daily      Facility-Administered Medications: None       PHYSICAL EXAM:  The patient is examined immediately prior to the procedure.  Vitals:    06/04/24 1045   BP: (!) 159/87   Pulse: 65   Resp:

## 2024-08-07 NOTE — H&P
Department  asked to send updates   [facility] ID [id number]    ID note/ per CM (MS), via portal    Assigned SW/CM to follow up with patient/family on discharge plan.     Pilar Esposito, DSC     History and Physical    Patient: Junaid Hurtado MRN: [id number]  SSN: [id number]    YOB: 1955  Age: 72 y.o. Sex: female      Subjective:      Chief Complaint: Headache and Hypertension    HPI: Junaid Hurtado is a 72 y.o. female with past medical history of hyperlipidemia and vitamin D deficiency [facility] of headache and elevated blood pressure. Ms. Macy Stokes reports an aching parietal headache since this past Sunday. Ms. Macy Stokes has checked her blood pressure several times and systolic readings have been approximately 160-180. She has had occasional dizziness but denies any blurred vision, syncope, chest pain, palpitations. Patient made an appointment to see her primary care physician but was unable to see her until Friday. Ms. Macy Stokes went to [facility] for further evaluation of headache and elevated blood pressure. On arrival to the ER, temperature is 98.1 °F, pulse 67, respirations 17, blood pressure 185/74 and oxygen saturation 100% on room air. Hypertension was treated with hydralazine 5 mg IV with good response. CT of the head reports mild patchy areas of subcortical and periventricular white matter hypodensity. Some of these foci appear to also involve the cortex. There is no evidence of parenchymal hemorrhage, mass-effect or midline shift. Neurology was consulted from the ER. Recommendation was to admit for MRI of the brain with and without IV contrast and formal neurology consult. Past medical history, past surgical history, family history, social history and home medication list was reviewed at the time of admission. Ms. Macy Stokes denies tobacco, alcohol or illicit drug use. She is currently . Ms. Macy Stokes is a full code.     Past Medical History:   Diagnosis Date    Hypercholesteremia     Vitamin D deficiency      Past Surgical History:   Procedure Laterality Date    HX HIP REPLACEMENT      HX OTHER SURGICAL colonoscopy/polypectomy 3yrs. ago    ID ABDOMEN SURGERY PROC UNLISTED      cholecystectomy      Family History   Problem Relation Age of Onset    Heart Disease Mother         CHF    Cancer Father         lung     Social History     Tobacco Use    Smoking status: Never Smoker    Smokeless tobacco: Never Used   Substance Use Topics    Alcohol use: Yes     Alcohol/week: 1.0 standard drinks     Types: 1 Glasses of wine per week     Comment: a glass of wine a week or so       Prior to Admission medications    Medication Sig Start Date End Date Taking? Authorizing Provider   ezetimibe (ZETIA) 10 mg tablet Take 10 mg by mouth daily. 2/19/21   Provider, Historical   ergocalciferol (ERGOCALCIFEROL) 1,250 mcg (50,000 unit) capsule Take 50,000 Units by mouth. Takes on Wednesdays and Sundays 2/19/21   Provider, Historical        No Known Allergies    Review of Systems:  Constitutional: Positive for elevated blood pressure. Denies fevers, chills, fatigue, weakness, unexplained weight loss, night sweats. Head, Eyes, Ears, Nose, Mouth, Throat: Denies nasal congestion, sore throat, rhinorrhea, earache, ringing of the ears, difficulty hearing, facial pain, facial swelling. Respiratory: Denies shortness of breath, wheezing, cough, sputum production, hemoptysis. Denies use of oxygen at home. Cardiovascular: Denies chest pain, irregular heart beat, racing pulse, lower extremity edema, dizziness, dyspnea on exertion, orthopnea. No lower extremity edema. Gastrointestinal: Denies nausea, vomiting, diarrhea, constipation, abdominal pain, loss of appetite, acid reflux, melena, hematochezia, change in bowel habits. Endocrine: Denies intolerance to heat or cold. Denies polyuria, polydipsia, polyphagia. Denies recent weight changes. Genitourinary: Denies increased urinary frequency, dysuria, hematuria, urinary incontinence, increased urinary frequency. Integument/Breast: Denies rash, itching or new skin lesions.   Musculoskeletal: Denies joint swelling, joint pain, myalgias, neck pain, back pain. Neurological: Positive for headache and occasional dizziness. Denies confusion, tremors, numbness/tingling, paresthesias, weakness, problems with balance, loss of consciousness. Hematologic: Denies easy bleeding, easy bruising, lymphadenopathy. Behavioral/Psychiatric: Denies anxiety, depression, increased irritability, mood swings, delusions, hallucination, SI/HI. Objective:     Vitals:    03/23/21 2303 03/24/21 0119   BP: (!) 185/74 (!) 149/92   Pulse: 67 67   Resp: 17    Temp: 98.1 °F (36.7 °C)    SpO2: 100%    Weight: 68.9 kg (152 lb)    Height: 5' 10\" (1.778 m)         Physical Exam:  General: Alert and Oriented x 3. Cooperative and friendly. No acute distress. Nourished and well developed. Wears glasses. Reports that headache has resolved. Head/Eyes: Normocephalic, atraumatic, EOMI, PERRLA. Nose/Mouth: Turbinates within normal limits, No drainage. Mucous membranes are moist.  Throat and Neck: No masses, JVD, thyromegaly or lymphadenopathy appreciated. Cervical spine has good range of motion without pain. Lungs: Clear to auscultation bilaterally without wheezes, rhonchi or crackles. Good air movement bilaterally. Symmetric chest rise with respirations. Heart: Regular rate and rhythm. Normal S1/S2. No appreciated murmurs, rubs or gallops. No lower extremity edema. Abdomen: Soft, non-tender, non-distended. Bowel sounds present in all four quadrants. No masses or hepatosplenomegaly appreciated. Extremities:  Atraumatic. Able to move all extremities symmetrically. No abnormal bony protuberances appreciated. Joints without swelling. Back: No pain with palpation over spinous processes or paraspinal musculature. No CVA tenderness. Skin: Clean, dry and intact without appreciated lesions. Neurologic: A&Ox3. Cranial nerves 2-12 are intact. Intact sensation and 5/5 motor strength in all 4 extremities. Facial features are symmetric. Speech is fluent and clear. No focal deficits appreciated. Psychiatric: Normal affect, normal thought process, good eye contact. Recent Results (from the past 24 hour(s))   CBC WITH AUTOMATED DIFF    Collection Time: 03/24/21 12:14 AM   Result Value Ref Range    WBC 6.5 3.6 - 11.0 K/uL    RBC 4.19 3.80 - 5.20 M/uL    HGB 13.7 11.5 - 16.0 g/dL    HCT 41.3 35.0 - 47.0 %    MCV 98.6 80.0 - 99.0 FL    MCH 32.7 26.0 - 34.0 PG    MCHC 33.2 30.0 - 36.5 g/dL    RDW 12.7 11.5 - 14.5 %    PLATELET 461 529 - 958 K/uL    MPV 11.0 8.9 - 12.9 FL    NEUTROPHILS 53 32 - 75 %    LYMPHOCYTES 34 12 - 49 %    MONOCYTES 11 5 - 13 %    EOSINOPHILS 2 0 - 7 %    BASOPHILS 0 0 - 1 %    IMMATURE GRANULOCYTES 0 0.0 - 0.5 %    ABS. NEUTROPHILS 3.4 1.8 - 8.0 K/UL    ABS. LYMPHOCYTES 2.2 0.8 - 3.5 K/UL    ABS. MONOCYTES 0.7 0.0 - 1.0 K/UL    ABS. EOSINOPHILS 0.1 0.0 - 0.4 K/UL    ABS. BASOPHILS 0.0 0.0 - 0.1 K/UL    ABS. IMM.  GRANS. 0.0 0.00 - 0.04 K/UL    DF AUTOMATED     METABOLIC PANEL, BASIC    Collection Time: 03/24/21 12:14 AM   Result Value Ref Range    Sodium 140 136 - 145 mmol/L    Potassium 3.7 3.5 - 5.1 mmol/L    Chloride 107 97 - 108 mmol/L    CO2 28 21 - 32 mmol/L    Anion gap 5 5 - 15 mmol/L    Glucose 105 (H) 65 - 100 mg/dL    BUN 17 6 - 20 mg/dL    Creatinine 0.84 0.55 - 1.02 mg/dL    BUN/Creatinine ratio 20 12 - 20      GFR est AA >60 >60 ml/min/1.73m2    GFR est non-AA >60 >60 ml/min/1.73m2    Calcium 9.3 8.5 - 10.1 mg/dL   PROTHROMBIN TIME + INR    Collection Time: 03/24/21 12:14 AM   Result Value Ref Range    Prothrombin time 13.0 11.9 - 14.7 sec    INR 1.0 0.9 - 1.1     PTT    Collection Time: 03/24/21 12:14 AM   Result Value Ref Range    aPTT 25.9 23.0 - 35.7 sec    aPTT, therapeutic range   68 - 109 sec   SED RATE (ESR)    Collection Time: 03/24/21 12:14 AM   Result Value Ref Range    Sed rate, automated 9 mm/hr   C REACTIVE PROTEIN, QT    Collection Time: 03/24/21 12:14 AM   Result Value Ref Range    C-Reactive protein <0.29 0.00 - 0.60 mg/dL       XR Results (maximum last 3): No results found for this or any previous visit. CT Results (maximum last 3): Results from [place] encounter on 03/23/21   CT HEAD WO CONT    Addendum Addendum: Results called to Physician: Cristy Yeboah  on 3/24/2021 12:02  AM.. IMPRESSION: No change to original impression, addendum made to reflect the physician with whom I spoke as noted above. Anastasiya Aleks, DO 3/24/2021 12:02 AM          Narrative HISTORY:  headache  Dose reduction technique: All CT scans at this facility are performed using dose reduction optimization  technique as appropriate on the exam including the following: Automated exposure  control, adjustment of the MA and/or KV according to patient size and/or use of  iterative reconstructive technique. TECHNIQUE: [Without contrast]  COMPARISON: [None]  LIMITATIONS: [None]    BRAIN: Mild patchy areas of subcortical and periventricular white matter  hypodensity. Some of these foci appear to also involve the cortex for example  posterior right frontal lobe axial image 21  No acute parenchymal hemorrhage,  mass effect or midline shift. VENTRICLES: No hydrocephalus. EXTRA-AXIAL SPACES/SULCI:  No extra-axial hemorrhage, fluid collection or mass. CALVARIUM/SKULL BASE: Normal    FACE/SINUSES: Visualized portions normal    SOFT TISSUES: Normal    OTHER: None      Impression mild patchy white matter abnormality as described is nonspecific but  could indicate sequela of chronic small vessel disease. No convincing acute  intracranial abnormality although some of the appearance of the patient's white  matter abnormalities may be atypical as described possibly involving the cortex  is well particularly in the right frontal lobe. . Would recommend MRI pre-/post  contrast to better evaluate for other potential causes which could include  recent cortical ischemia, underlying mass process, vasculitis. .    The ER physician was unable to take my call at the time of this interpretation. The findings were conveyed to the ER staff and they will have the physician call  me back with any questions. Findings were conveyed to staff member nurse Tere Rivas       Assessment:     Rd Roach is a 72 y.o. female who presents with complaints of headache and elevated blood pressure. Blood pressure was controlled with hydralazine 5 mg IV. CT of the head has abnormal findings. Tele-neurologist recommended admission for MRI with and without IV contrast and neurology consult. Plan:     1. Admit to remote telemetry bed under observation status. 2.  Order neurology consult for the morning. Every 4 hours neuro checks. Order MRI with and without contrast of the brain. Check lipid panel. 3.  New diagnosis of hypertension. Order p.o. hydralazine as needed for systolic blood pressure greater than 140. Patient is requesting prescription at discharge until she is able to see her primary care physician this Friday. 4.  For history of hyperlipidemia, continue home dose of Zetia. GI PPX: Diet ordered. DVT PPX: SCDs.     Signed By: Pilo Aviles MD     March 24, 2021

## (undated) DEVICE — CONTAINER SPEC 20 ML LID NEUT BUFF FORMALIN 10 % POLYPR STS

## (undated) DEVICE — SNARE ENDOSCP POLYP MED STD AD 2.4X27X240 CM 2.8 MM OVL SENS

## (undated) DEVICE — SYRINGE INFL 60ML DISP ALLIANCE II